# Patient Record
Sex: MALE | Race: WHITE | Employment: FULL TIME | URBAN - METROPOLITAN AREA
[De-identification: names, ages, dates, MRNs, and addresses within clinical notes are randomized per-mention and may not be internally consistent; named-entity substitution may affect disease eponyms.]

---

## 2018-10-15 ENCOUNTER — HOSPITAL ENCOUNTER (OUTPATIENT)
Age: 48
Discharge: HOME | End: 2018-10-15
Payer: COMMERCIAL

## 2019-04-27 ENCOUNTER — NURSE TRIAGE (OUTPATIENT)
Dept: OTHER | Facility: CLINIC | Age: 49
End: 2019-04-27

## 2019-04-27 ENCOUNTER — HOSPITAL ENCOUNTER (INPATIENT)
Age: 49
LOS: 3 days | Discharge: HOME | DRG: 885 | End: 2019-05-01
Attending: EMERGENCY MEDICINE | Admitting: PSYCHIATRY & NEUROLOGY
Payer: COMMERCIAL

## 2019-04-27 DIAGNOSIS — R45.851 SUICIDAL IDEATION: Primary | ICD-10-CM

## 2019-04-27 LAB
ACETAMINOPHEN LEVEL: <5 MCG/ML (ref 10–30)
ALBUMIN SERPL-MCNC: 4.6 G/DL (ref 3.5–5.2)
ALP BLD-CCNC: 108 U/L (ref 40–129)
ALT SERPL-CCNC: 24 U/L (ref 0–40)
AMPHETAMINE SCREEN, URINE: NOT DETECTED
ANION GAP SERPL CALCULATED.3IONS-SCNC: 14 MMOL/L (ref 7–16)
AST SERPL-CCNC: 23 U/L (ref 0–39)
BACTERIA: NORMAL /HPF
BARBITURATE SCREEN URINE: NOT DETECTED
BASOPHILS ABSOLUTE: 0.05 E9/L (ref 0–0.2)
BASOPHILS RELATIVE PERCENT: 0.4 % (ref 0–2)
BENZODIAZEPINE SCREEN, URINE: NOT DETECTED
BILIRUB SERPL-MCNC: 0.3 MG/DL (ref 0–1.2)
BILIRUBIN URINE: NEGATIVE
BLOOD, URINE: NEGATIVE
BUN BLDV-MCNC: 10 MG/DL (ref 6–20)
CALCIUM SERPL-MCNC: 9.8 MG/DL (ref 8.6–10.2)
CANNABINOID SCREEN URINE: NOT DETECTED
CHLORIDE BLD-SCNC: 102 MMOL/L (ref 98–107)
CLARITY: CLEAR
CO2: 28 MMOL/L (ref 22–29)
COCAINE METABOLITE SCREEN URINE: NOT DETECTED
COLOR: ABNORMAL
CREAT SERPL-MCNC: 1 MG/DL (ref 0.7–1.2)
EOSINOPHILS ABSOLUTE: 0.16 E9/L (ref 0.05–0.5)
EOSINOPHILS RELATIVE PERCENT: 1.4 % (ref 0–6)
ETHANOL: 213 MG/DL (ref 0–0.08)
GFR AFRICAN AMERICAN: >60
GFR NON-AFRICAN AMERICAN: >60 ML/MIN/1.73
GLUCOSE BLD-MCNC: 113 MG/DL (ref 74–99)
GLUCOSE URINE: NEGATIVE MG/DL
HCT VFR BLD CALC: 48 % (ref 37–54)
HEMOGLOBIN: 16.3 G/DL (ref 12.5–16.5)
IMMATURE GRANULOCYTES #: 0.08 E9/L
IMMATURE GRANULOCYTES %: 0.7 % (ref 0–5)
KETONES, URINE: NEGATIVE MG/DL
LEUKOCYTE ESTERASE, URINE: ABNORMAL
LYMPHOCYTES ABSOLUTE: 3.17 E9/L (ref 1.5–4)
LYMPHOCYTES RELATIVE PERCENT: 27.6 % (ref 20–42)
MCH RBC QN AUTO: 29.1 PG (ref 26–35)
MCHC RBC AUTO-ENTMCNC: 34 % (ref 32–34.5)
MCV RBC AUTO: 85.6 FL (ref 80–99.9)
METHADONE SCREEN, URINE: NOT DETECTED
MONOCYTES ABSOLUTE: 0.76 E9/L (ref 0.1–0.95)
MONOCYTES RELATIVE PERCENT: 6.6 % (ref 2–12)
NEUTROPHILS ABSOLUTE: 7.26 E9/L (ref 1.8–7.3)
NEUTROPHILS RELATIVE PERCENT: 63.3 % (ref 43–80)
NITRITE, URINE: NEGATIVE
OPIATE SCREEN URINE: NOT DETECTED
PDW BLD-RTO: 14.2 FL (ref 11.5–15)
PH UA: 6.5 (ref 5–9)
PHENCYCLIDINE SCREEN URINE: NOT DETECTED
PLATELET # BLD: 298 E9/L (ref 130–450)
PMV BLD AUTO: 8.9 FL (ref 7–12)
POTASSIUM SERPL-SCNC: 3.9 MMOL/L (ref 3.5–5)
PROPOXYPHENE SCREEN: NOT DETECTED
PROTEIN UA: NEGATIVE MG/DL
RBC # BLD: 5.61 E12/L (ref 3.8–5.8)
RBC UA: NORMAL /HPF (ref 0–2)
SALICYLATE, SERUM: <0.3 MG/DL (ref 0–30)
SODIUM BLD-SCNC: 144 MMOL/L (ref 132–146)
SPECIFIC GRAVITY UA: <=1.005 (ref 1–1.03)
TOTAL PROTEIN: 8.2 G/DL (ref 6.4–8.3)
TRICYCLIC ANTIDEPRESSANTS SCREEN SERUM: NEGATIVE NG/ML
TSH SERPL DL<=0.05 MIU/L-ACNC: 3.59 UIU/ML (ref 0.27–4.2)
UROBILINOGEN, URINE: 0.2 E.U./DL
WBC # BLD: 11.5 E9/L (ref 4.5–11.5)
WBC UA: NORMAL /HPF (ref 0–5)

## 2019-04-27 PROCEDURE — 93005 ELECTROCARDIOGRAM TRACING: CPT | Performed by: NURSE PRACTITIONER

## 2019-04-27 PROCEDURE — 80307 DRUG TEST PRSMV CHEM ANLYZR: CPT

## 2019-04-27 PROCEDURE — 81001 URINALYSIS AUTO W/SCOPE: CPT

## 2019-04-27 PROCEDURE — 36415 COLL VENOUS BLD VENIPUNCTURE: CPT

## 2019-04-27 PROCEDURE — 80053 COMPREHEN METABOLIC PANEL: CPT

## 2019-04-27 PROCEDURE — 85025 COMPLETE CBC W/AUTO DIFF WBC: CPT

## 2019-04-27 PROCEDURE — 84443 ASSAY THYROID STIM HORMONE: CPT

## 2019-04-27 PROCEDURE — 99285 EMERGENCY DEPT VISIT HI MDM: CPT

## 2019-04-27 PROCEDURE — G0480 DRUG TEST DEF 1-7 CLASSES: HCPCS

## 2019-04-27 RX ORDER — RANITIDINE 150 MG/1
150 TABLET ORAL 2 TIMES DAILY
COMMUNITY

## 2019-04-27 RX ORDER — LORAZEPAM 2 MG/ML
2 INJECTION INTRAMUSCULAR ONCE
Status: DISCONTINUED | OUTPATIENT
Start: 2019-04-27 | End: 2019-05-01 | Stop reason: HOSPADM

## 2019-04-27 ASSESSMENT — PAIN DESCRIPTION - DESCRIPTORS: DESCRIPTORS: ACHING

## 2019-04-27 ASSESSMENT — PAIN DESCRIPTION - PAIN TYPE: TYPE: CHRONIC PAIN

## 2019-04-27 ASSESSMENT — PAIN DESCRIPTION - LOCATION: LOCATION: GENERALIZED

## 2019-04-27 ASSESSMENT — PAIN SCALES - GENERAL: PAINLEVEL_OUTOF10: 10
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
## 2019-04-27 NOTE — LETTER
[address] [place] [address]  Phone: [phone]        May 1, 2019     Patient: Prudencio Sow   YOB: 1970   Date of Visit: 4/27/2019       To Whom it May Concern:    Dyllan Finley was admitted to the hospital 4/27/19 and medically discharged 5/1/19. If you have any questions or concerns, please don't hesitate to call.     Sincerely,         Raven Carrasco MSW, LSW

## 2019-04-28 PROBLEM — R45.851 DEPRESSION WITH SUICIDAL IDEATION: Status: ACTIVE | Noted: 2019-04-28

## 2019-04-28 PROBLEM — F32.A DEPRESSION WITH SUICIDAL IDEATION: Status: ACTIVE | Noted: 2019-04-28

## 2019-04-28 PROBLEM — F33.2 SEVERE EPISODE OF RECURRENT MAJOR DEPRESSIVE DISORDER, WITHOUT PSYCHOTIC FEATURES (HCC): Status: ACTIVE | Noted: 2019-04-28

## 2019-04-28 LAB — ETHANOL: 113 MG/DL (ref 0–0.08)

## 2019-04-28 PROCEDURE — 6370000000 HC RX 637 (ALT 250 FOR IP): Performed by: PSYCHIATRY & NEUROLOGY

## 2019-04-28 PROCEDURE — 6370000000 HC RX 637 (ALT 250 FOR IP): Performed by: NURSE PRACTITIONER

## 2019-04-28 PROCEDURE — G0480 DRUG TEST DEF 1-7 CLASSES: HCPCS

## 2019-04-28 PROCEDURE — 36415 COLL VENOUS BLD VENIPUNCTURE: CPT

## 2019-04-28 PROCEDURE — 99221 1ST HOSP IP/OBS SF/LOW 40: CPT | Performed by: NURSE PRACTITIONER

## 2019-04-28 PROCEDURE — 1240000000 HC EMOTIONAL WELLNESS R&B

## 2019-04-28 RX ORDER — MONTELUKAST SODIUM 10 MG/1
10 TABLET ORAL NIGHTLY
Status: DISCONTINUED | OUTPATIENT
Start: 2019-04-28 | End: 2019-05-01 | Stop reason: HOSPADM

## 2019-04-28 RX ORDER — FOLIC ACID 1 MG/1
1 TABLET ORAL DAILY
Status: DISCONTINUED | OUTPATIENT
Start: 2019-04-28 | End: 2019-05-01 | Stop reason: HOSPADM

## 2019-04-28 RX ORDER — NALTREXONE HYDROCHLORIDE 50 MG/1
50 TABLET, FILM COATED ORAL DAILY
Status: DISCONTINUED | OUTPATIENT
Start: 2019-04-28 | End: 2019-05-01 | Stop reason: HOSPADM

## 2019-04-28 RX ORDER — NICOTINE 21 MG/24HR
1 PATCH, TRANSDERMAL 24 HOURS TRANSDERMAL DAILY
Status: DISCONTINUED | OUTPATIENT
Start: 2019-04-28 | End: 2019-05-01 | Stop reason: HOSPADM

## 2019-04-28 RX ORDER — FLUTICASONE PROPIONATE 50 MCG
1 SPRAY, SUSPENSION (ML) NASAL DAILY
Status: DISCONTINUED | OUTPATIENT
Start: 2019-04-28 | End: 2019-05-01 | Stop reason: HOSPADM

## 2019-04-28 RX ORDER — LORAZEPAM 1 MG/1
3 TABLET ORAL
Status: DISCONTINUED | OUTPATIENT
Start: 2019-04-28 | End: 2019-05-01

## 2019-04-28 RX ORDER — LORAZEPAM 2 MG/ML
1 INJECTION INTRAMUSCULAR
Status: DISCONTINUED | OUTPATIENT
Start: 2019-04-28 | End: 2019-05-01

## 2019-04-28 RX ORDER — LISINOPRIL 10 MG/1
10 TABLET ORAL DAILY
Status: DISCONTINUED | OUTPATIENT
Start: 2019-04-28 | End: 2019-05-01 | Stop reason: HOSPADM

## 2019-04-28 RX ORDER — THIAMINE MONONITRATE (VIT B1) 100 MG
100 TABLET ORAL DAILY
Status: DISCONTINUED | OUTPATIENT
Start: 2019-04-28 | End: 2019-05-01 | Stop reason: HOSPADM

## 2019-04-28 RX ORDER — LORAZEPAM 1 MG/1
2 TABLET ORAL
Status: DISCONTINUED | OUTPATIENT
Start: 2019-04-28 | End: 2019-05-01

## 2019-04-28 RX ORDER — MULTIVITAMIN WITH FOLIC ACID 400 MCG
1 TABLET ORAL DAILY
Status: DISCONTINUED | OUTPATIENT
Start: 2019-04-28 | End: 2019-05-01 | Stop reason: HOSPADM

## 2019-04-28 RX ORDER — LORAZEPAM 2 MG/ML
4 INJECTION INTRAMUSCULAR
Status: DISCONTINUED | OUTPATIENT
Start: 2019-04-28 | End: 2019-05-01

## 2019-04-28 RX ORDER — SODIUM CHLORIDE 0.9 % (FLUSH) 0.9 %
10 SYRINGE (ML) INJECTION EVERY 12 HOURS SCHEDULED
Status: DISCONTINUED | OUTPATIENT
Start: 2019-04-28 | End: 2019-05-01

## 2019-04-28 RX ORDER — ONDANSETRON 2 MG/ML
4 INJECTION INTRAMUSCULAR; INTRAVENOUS EVERY 6 HOURS PRN
Status: DISCONTINUED | OUTPATIENT
Start: 2019-04-28 | End: 2019-05-01

## 2019-04-28 RX ORDER — LORAZEPAM 2 MG/ML
2 INJECTION INTRAMUSCULAR
Status: DISCONTINUED | OUTPATIENT
Start: 2019-04-28 | End: 2019-05-01

## 2019-04-28 RX ORDER — LORAZEPAM 1 MG/1
1 TABLET ORAL
Status: DISCONTINUED | OUTPATIENT
Start: 2019-04-28 | End: 2019-05-01

## 2019-04-28 RX ORDER — LORAZEPAM 1 MG/1
4 TABLET ORAL
Status: DISCONTINUED | OUTPATIENT
Start: 2019-04-28 | End: 2019-05-01

## 2019-04-28 RX ORDER — FAMOTIDINE 20 MG/1
20 TABLET, FILM COATED ORAL 2 TIMES DAILY
Status: DISCONTINUED | OUTPATIENT
Start: 2019-04-28 | End: 2019-05-01 | Stop reason: HOSPADM

## 2019-04-28 RX ORDER — DIVALPROEX SODIUM 250 MG/1
500 TABLET, DELAYED RELEASE ORAL 2 TIMES DAILY
Status: DISCONTINUED | OUTPATIENT
Start: 2019-04-28 | End: 2019-05-01 | Stop reason: HOSPADM

## 2019-04-28 RX ORDER — LORAZEPAM 2 MG/ML
3 INJECTION INTRAMUSCULAR
Status: DISCONTINUED | OUTPATIENT
Start: 2019-04-28 | End: 2019-05-01

## 2019-04-28 RX ORDER — SODIUM CHLORIDE 0.9 % (FLUSH) 0.9 %
10 SYRINGE (ML) INJECTION PRN
Status: DISCONTINUED | OUTPATIENT
Start: 2019-04-28 | End: 2019-05-01

## 2019-04-28 RX ORDER — THIAMINE HYDROCHLORIDE 100 MG/ML
100 INJECTION, SOLUTION INTRAMUSCULAR; INTRAVENOUS DAILY
Status: DISCONTINUED | OUTPATIENT
Start: 2019-04-28 | End: 2019-04-28 | Stop reason: SDUPTHER

## 2019-04-28 RX ORDER — CETIRIZINE HYDROCHLORIDE 10 MG/1
10 TABLET ORAL DAILY
Status: DISCONTINUED | OUTPATIENT
Start: 2019-04-28 | End: 2019-05-01 | Stop reason: HOSPADM

## 2019-04-28 RX ADMIN — LISINOPRIL 10 MG: 10 TABLET ORAL at 11:30

## 2019-04-28 RX ADMIN — NALTREXONE HYDROCHLORIDE 50 MG: 50 TABLET, FILM COATED ORAL at 11:30

## 2019-04-28 RX ADMIN — FOLIC ACID 1 MG: 1 TABLET ORAL at 08:38

## 2019-04-28 RX ADMIN — NICOTINE POLACRILEX 2 MG: 2 GUM, CHEWING BUCCAL at 13:37

## 2019-04-28 RX ADMIN — NICOTINE POLACRILEX 2 MG: 2 GUM, CHEWING BUCCAL at 09:28

## 2019-04-28 RX ADMIN — DIVALPROEX SODIUM 500 MG: 250 TABLET, DELAYED RELEASE ORAL at 11:29

## 2019-04-28 RX ADMIN — DIVALPROEX SODIUM 500 MG: 250 TABLET, DELAYED RELEASE ORAL at 20:56

## 2019-04-28 RX ADMIN — FLUTICASONE PROPIONATE 1 SPRAY: 50 SPRAY, METERED NASAL at 11:29

## 2019-04-28 RX ADMIN — FAMOTIDINE 20 MG: 20 TABLET ORAL at 20:56

## 2019-04-28 RX ADMIN — FAMOTIDINE 20 MG: 20 TABLET ORAL at 11:29

## 2019-04-28 RX ADMIN — MONTELUKAST SODIUM 10 MG: 10 TABLET, FILM COATED ORAL at 20:56

## 2019-04-28 RX ADMIN — MULTIVITAMIN TABLET 1 TABLET: TABLET at 08:38

## 2019-04-28 RX ADMIN — NICOTINE POLACRILEX 2 MG: 2 GUM, CHEWING BUCCAL at 19:14

## 2019-04-28 RX ADMIN — SERTRALINE 150 MG: 100 TABLET, FILM COATED ORAL at 11:30

## 2019-04-28 RX ADMIN — CETIRIZINE HYDROCHLORIDE 10 MG: 10 TABLET, FILM COATED ORAL at 11:29

## 2019-04-28 RX ADMIN — Medication 100 MG: at 08:38

## 2019-04-28 ASSESSMENT — PAIN SCALES - GENERAL
PAINLEVEL_OUTOF10: 0

## 2019-04-28 ASSESSMENT — LIFESTYLE VARIABLES
HISTORY_ALCOHOL_USE: YES
HISTORY_ALCOHOL_USE: YES

## 2019-04-28 ASSESSMENT — SLEEP AND FATIGUE QUESTIONNAIRES
RESTFUL SLEEP: YES
DO YOU HAVE DIFFICULTY SLEEPING: YES
DIFFICULTY ARISING: NO
DO YOU USE A SLEEP AID: NO
DO YOU HAVE DIFFICULTY SLEEPING: YES
DIFFICULTY STAYING ASLEEP: YES
SLEEP PATTERN: DISTURBED/INTERRUPTED SLEEP
AVERAGE NUMBER OF SLEEP HOURS: 6
AVERAGE NUMBER OF SLEEP HOURS: 6
SLEEP PATTERN: DISTURBED/INTERRUPTED SLEEP
DIFFICULTY STAYING ASLEEP: YES
RESTFUL SLEEP: YES
DO YOU USE A SLEEP AID: NO
DIFFICULTY FALLING ASLEEP: YES
DIFFICULTY ARISING: NO
DIFFICULTY FALLING ASLEEP: YES

## 2019-04-28 ASSESSMENT — PATIENT HEALTH QUESTIONNAIRE - PHQ9
SUM OF ALL RESPONSES TO PHQ QUESTIONS 1-9: 4
SUM OF ALL RESPONSES TO PHQ QUESTIONS 1-9: 8

## 2019-04-28 NOTE — H&P
PSYCHIATRIC EVALUATION  (HISTORY & PHYSICAL)     CHIEF COMPLAINT:   [x] Mood Problems [x] Anxiety Problems [] Psychosis                    [x] Suicidal/Homicidal   [] Aggression  [] Other    HISTORY OF PRESENT ILLNESS: Enzo Rausch  is a 53 y.o. male who has a previous psychiatric history of depression and alcohol use and presents for admission with increased sadness and SI with plan to shoot self while intoxicated. Patient states that he lost his mother the day before Easter 11 years ago due to a drunk  and lost his  this year the day before Easter. Patient states that he felt increasingly sad and \"had a bad week. \"  patient denies HI or AVH. Patient is interested in alcohol treatment outpatient. Symptoms are constant, severe, and worsened by alcohol use.         Precipitating Factors:     [] Family Stress   [x] Recent loss/grief Stress   [] Health Stress   [] Relationship Stress    [] Legal Stress   [x] Environmental Stress    [] Occupational Stress   [] Financial Stress   [x] Substance Abuse [] Other      PAST PSYCHIATRIC HISTORY:   History of psychiatric Hospitalization:    [x] Denies    [] yes  [] Days ago     []  Weeks Ago    [] Months ago  [] Years ago              [] [facility]  [] [facility]  [] Other:        [] Once  [] More than once    Outpatient treatment:  [] [facility]  [] [facility]  [] [facility]              [] [facility]  [] John Gaston      [] [address] [place] [address] [] [facility]      [] [facility] [] [facility]  [] [facility] [] [facility]             [] currently  [x] in the past  [] Non-Compliant    [] Denies    Previous suicide attempt: [x]Denies            [] yes  [] OD  [] Cutting  [] Hanging  [] Gun  [] Other    Previous psych medications:  [] Was prescribed               [x] Currently Taking       [] Never taken medications      PAST MEDICAL HISTORY:       Diagnosis Date    Hypertension        FAMILY PSYCHIATRIC HISTORY:  Family History   Problem Relation Age of Onset    Heart Disease Mother     Diabetes Mother     Thyroid Disease Mother     Heart Disease Father            [x] Denies       [] Endorses               [] Father                [] Depression  [] Anxiety  [] Bipolar  [] Psychosis  []  Other                  [] Mother               [] Depression  [] Anxiety  [] Bipolar  [] Psychosis  []  Other                  [] Sibling               [] Depression  [] Anxiety  [] Bipolar  [] Psychosis  []  Other                  [] Grandparent               [] Depression  [] Anxiety  [] Bipolar  [] Psychosis  []  Other       SOCIAL HISTORY:     1. Living Situation:[x] Private Residence [] Homeless [] [address]             [] Assisted Living [] [address]  [] Shelter [] Other   2. Employment:  [] Unemployed  [x] Employed  [] Disabled  [] Retired   1. Legal History: [x] No Arrest [] Arrest  [] Theft  []  Assault  [] Substances   4. History of Trama/ Abuse: [x] Denies  [] Emotional [] Physical [] Sexual   5. Spirituality: [x] Spiritual [] Not Spiritual   6. Substance Abuse: [] Denies  [x] Drug of choice      [] Amphetamines [] Marijuana [] Cocaine      [] Opioids  [x] Alcohol  [] Benzodiazepines     For further SH review SW note. Risk Assessment:  1. Risk Factors:   [x] Depression  [x] Anxiety  [] Psychosis   [x] Suicidal/Homicidal Thoughts [] Suicide Attempt [x] Substance Abuse     2. Protective Factors: [x] Controlled Environment         [x] Supportive Family [x]         [] [employer] Support     3. Level of Risk: [] Mild [] Moderate [x] Severe      Strengths & Weaknesses:    1. Strengths: [x] Ability to communicate feelings     [x] Independent ADL's     [x] Supportive Family    [] Current Health Status     2.  Weaknesses: [] Emotional          [] Motivational     MEDICATIONS: Current Facility-Administered Medications: sodium chloride flush 0.9 % injection 10 mL, 10 mL, Intravenous, 2 times per day  sodium chloride flush 0.9 % injection 10 mL, 10 mL, Intravenous, PRN  vitamin B-1 (THIAMINE) tablet 100 mg, 100 mg, Oral, Daily  folic acid (FOLVITE) tablet 1 mg, 1 mg, Oral, Daily  multivitamin 1 tablet, 1 tablet, Oral, Daily  ondansetron (ZOFRAN) injection 4 mg, 4 mg, Intravenous, Q6H PRN  nicotine (NICODERM CQ) 21 MG/24HR 1 patch, 1 patch, Transdermal, Daily  nicotine polacrilex (NICORETTE) gum 2 mg, 2 mg, Oral, Q2H PRN  LORazepam (ATIVAN) tablet 1 mg, 1 mg, Oral, Q1H PRN **OR** LORazepam (ATIVAN) injection 1 mg, 1 mg, Intravenous, Q1H PRN **OR** LORazepam (ATIVAN) tablet 2 mg, 2 mg, Oral, Q1H PRN **OR** LORazepam (ATIVAN) injection 2 mg, 2 mg, Intravenous, Q1H PRN **OR** LORazepam (ATIVAN) tablet 3 mg, 3 mg, Oral, Q1H PRN **OR** LORazepam (ATIVAN) injection 3 mg, 3 mg, Intravenous, Q1H PRN **OR** LORazepam (ATIVAN) tablet 4 mg, 4 mg, Oral, Q1H PRN **OR** LORazepam (ATIVAN) injection 4 mg, 4 mg, Intravenous, Q1H PRN  cetirizine (ZYRTEC) tablet 10 mg, 10 mg, Oral, Daily  fluticasone (FLONASE) 50 MCG/ACT nasal spray 1 spray, 1 spray, Nasal, Daily  lisinopril (PRINIVIL;ZESTRIL) tablet 10 mg, 10 mg, Oral, Daily  montelukast (SINGULAIR) tablet 10 mg, 10 mg, Oral, Nightly  famotidine (PEPCID) tablet 20 mg, 20 mg, Oral, BID  sertraline (ZOLOFT) tablet 150 mg, 150 mg, Oral, Daily  LORazepam (ATIVAN) injection 2 mg, 2 mg, Intramuscular, Once    Medical Review of Systems:     All other than marked systmes have been reviewed and are all negative.     Constitutional Symptoms: []  fever []  Chills  Skin Symptoms: [] rash []  Pruritus   Eye Symptoms: [] Vision unchanged []  recent vision problems[] blurred vision   Respiratory Symptoms:[] shortness of breath [] cough  Cardiovascular Symptoms:  [] chest pain   [] palpitations   Gastrointestinal Symptoms: []  abdominal pain []  nausea []  vomiting []  diarrhea  Genitourinary Symptoms: []  dysuria  []  hematuria   Musculoskeletal Symptoms: []  back pain []  muscle pain [x]  joint pain  Neurologic Symptoms: []  headache []  dizziness  Hematolymphoid Symptoms: [] Adenopathy [] Bruises   [] Schimosis       VITALS: BP (!) 158/104   Pulse 85   Temp 97.8 °F (36.6 °C) (Oral)   Resp 16   Ht 5' 7\" (1.702 m)   Wt 250 lb (113.4 kg)   SpO2 97%   BMI 39.16 kg/m²     ALLERGIES: Amoxicillin            Physical Examination:    Head:  [x] Atraumatic:  [x] normocephalic  Skin and Mucosa       [] Moist [] Dry [] Pale [x] Normal   Neck: [x] Thyroid [] Palpable    [x] Not palpable []  venus distention [] adenopathy   Chest: [x] Clear [] Rhonchi  [] Wheezing   CV: [x] S1 [x] S2 [x] No murmer   Abdomen:  [x] Soft   [] Tender  [] Viceromegaly   Extremities:  [x] No Edema   [] Edema    Cranial Nerves Examination:    CN II: [x] Pupils are reactive to light [] Pupils are non reactive to light  CN III, IV, VI:[x] No eye deviation  [x] No diplopia or ptosis   CN V: [x] Facial Sensation is intact  [] Facial Sensation is not intact   CN IIIV:  [x] Hearing is normal to rubbing fingers   CN IX, X:  [x] Normal gag reflex and phonation   CN XI: [x] Shoulder shrug and neck rotation is normal  CNXII: [x] Tongue is midline no deviation or atrophy       For further PE refer to ED note    MENTAL STATUS EXAM:       Mental Status Examination:    Cognition:      [x] Alert  [x] Awake  [x] Oriented  [x] Person  [x] Place [x] Time      [] drowsy  [] tired  [] lethargic  [] distractable  []     Attention/Concentration:   [x] Attentive  [] Distracted        Memory Recent and Remote: [x] Intact   [] Impaired [] Partially Impaired     Language: [] Able to recognize and name objects          [] Unable to recognize and name Objects    Fund of Knowledge:  [] Poor []  Fair  [x] Good    Speech: [x] Normal  [] Soft  [] Slow  [] Fast [] Pressured            [] Loud [] Dysarthria  [] Incoherent       Appearance: [] Well Groomed  [x] Casual Dressed  [] Unkept  [] Disheveled          [] Normal weight[] Thin  [] Overweight  [] Obese           Attitude: [] Positive  [] Hostile  [] Demanding  [] Guarded  [] Defensive

## 2019-04-28 NOTE — ED NOTES
Please contact Dr Trina Levin in the am for CPAP setting so unit can be ordered for bedtime. PEER Support consult has been put in. Report called to [facility], RN.  Pt going to [address]  04/28/19 [id number]

## 2019-04-28 NOTE — ED NOTES
ANA signed for wife, Sanjuanita Garsia. Wife and kids have left to go home for the night. They are aware of him being admitted after being medically cleared.  Phone to contact: [phone]     Wiliam Guzman RN  04/27/19 [address], RN  04/27/19 3704

## 2019-04-28 NOTE — PROGRESS NOTES
`[facility]  Admission Note     Admission Type:   Admission Type: Involuntary    Reason for admission:  Reason for Admission: suicidal with plan to eat bullet    PATIENT STRENGTHS:  Strengths: Communication    Patient Strengths and Limitations:  Limitations: Tendency to isolate self    Addictive Behavior:   Addictive Behavior  In the past 3 months, have you felt or has someone told you that you have a problem with:  : None  Do you have a history of Chemical Use?: No  Do you have a history of Alcohol Use?: Yes  Do you have a history of Street Drug Abuse?: No  Histroy of Prescripton Drug Abuse?: No    Medical Problems:   Past Medical History:   Diagnosis Date    Hypertension        Status EXAM:  Status and Exam  Normal: No  Facial Expression: Worried, Sad  Affect: Appropriate  Level of Consciousness: Alert  Mood:Normal: No  Mood: Anxious, Depressed, Sad  Motor Activity:Normal: Yes  Motor Activity: Decreased  Interview Behavior: Cooperative, Irritable  Preception: [place] to Person, Janann Ruse to Time, [place] to Place, [place] to Situation  Attention:Normal: Yes  Attention: (organized)  Thought Content:Normal: Yes  Hallucinations: None  Delusions: No  Memory:Normal: Yes  Insight and Judgment: No  Insight and Judgment: Poor Judgment, Poor Insight, Unmotivated, Unrealistic  Present Suicidal Ideation: No  Present Homicidal Ideation: No    Tobacco Screening:  Practical Counseling, on admission, shahida X, if applicable and completed (first 3 are required if patient doesn't refuse):             ( x)  Recognizing danger situations (included triggers and roadblocks)                    (x )  Coping skills (new ways to manage stress, exercise, relaxation techniques, changing routine, distraction)                                                           ( x)  Basic information about quitting (benefits of quitting, techniques in how to quit, available resources  ( ) [facility]                                           ( ) Patient refused counseling  ( ) Patient has not smoked in the last 30 days    Metabolic Screening:    No results found for: LABA1C    Lab Results   Component Value Date    CHOL 243 (H) 12/03/2016    CHOL 202 (H) 10/30/2015    CHOL 228 (H) 10/31/2014    CHOL 210 (H) 03/20/2012    CHOL 236 (H) 11/08/2010     Lab Results   Component Value Date    TRIG 172 (H) 12/03/2016    TRIG 102 10/30/2015    TRIG 126 10/31/2014    TRIG 159 (H) 03/20/2012    TRIG 162 (H) 11/08/2010     Lab Results   Component Value Date    HDL 56 12/03/2016    HDL 45 10/30/2015    HDL 48 10/31/2014    HDL 44.0 03/20/2012    HDL 48.0 11/08/2010     No components found for: LDLCAL  Lab Results   Component Value Date    LABVLDL 34 12/03/2016    LABVLDL 20 10/30/2015    LABVLDL 25 10/31/2014         Body mass index is 39.16 kg/m². BP Readings from Last 2 Encounters:   04/28/19 (!) 142/88   01/27/17 (!) 148/89           Pt admitted with followings belongings:  Dentures: None  Vision - Corrective Lenses: Glasses  Hearing Aid: None  Jewelry: Ring  Body Piercings Removed: N/A  Clothing: Pants, Shirt, Undergarments (Comment), Socks  Were All Patient Medications Collected?: Not Applicable  Other Valuables: Cell phone     . Patient oriented to surroundings and program expectations and copy of patient rights given. Received admission packet:  yes. Consents reviewed, signed yes. Refused no. Patient verbalize understanding:  yes.     Patient education on precautions: yes                   Suyapa Thibodeaux RN

## 2019-04-28 NOTE — ED NOTES
Pt told this  to Gretel Da Silva off\". Pt is agitated at multiple ER personal and ED nurse Herlinda Poe was able to complete an assessment. Pt ETOH is 213 at this time. Unable to complete CSSRS and SBIRT.      Leola Mcgill, [facility]  04/27/19 [id number]

## 2019-04-28 NOTE — ED PROVIDER NOTES
History: family history includes Diabetes in his mother; Heart Disease in his father and mother; Thyroid Disease in his mother. Allergies: Amoxicillin    Physical Exam           ED Triage Vitals   BP Temp Temp src Pulse Resp SpO2 Height Weight   04/27/19 2007 04/27/19 1945 -- 04/27/19 1945 04/27/19 2007 04/27/19 1945 04/27/19 2007 04/27/19 2007   (!) 140/96 97.8 °F (36.6 °C)  87 18 96 % 5' 7\" (1.702 m) 250 lb (113.4 kg)      Oxygen Saturation Interpretation: Normal.    General Appearance:  well-appearing, street clothes, fair grooming and fair hygiene. Constitutional:   Level of Consciousness: Awake and alert. ETOH: No.          Distress: none. Cooperativeness: cooperative. Eyes:  PERRL, EOMI, no discharge or conjunctival injection. Ears:  External ears without lesions. Throat:  Pharynx without injection, exudate, or tonsillar hypertrophy. Airway patient. Neck:  Normal ROM. Supple. Respiratory:  Clear to auscultation and breath sounds equal.  CV:  Regular rate and rhythm, normal heart sounds, without pathological murmurs, ectopy, gallops, or rubs. GI:  Abdomen Soft, nontender, good bowel sounds. No firm or pulsatile mass. Back:  No costovertebral tenderness. Integument:  Normal turgor. Warm, dry, without visible rash, unless noted elsewhere. Lymphatics: No lymphangitis or adenopathy noted. Neurological:  Oriented. Motor functions intact. Psychiatric:        Thought Process:       Coherent:  Yes. Delusions / Paranoia: no evidence of delusions and no evidence of paranoia. Flight of ideas:  No.         Rambling conversation:  No.       Affect: labile. Suicidal ideation:  suicidal ideation with clear plan and intent. Homicidal ideation:  no homicidal ideation. Perceptions:  denies any perceptual disturbance present. Insight: below average. Judgement: below average.     Lab / Imaging Results   (All laboratory and radiology results have been personally reviewed by myself)  Labs:  Results for orders placed or performed during the hospital encounter of 04/27/19   Comprehensive Metabolic Panel   Result Value Ref Range    Sodium 144 132 - 146 mmol/L    Potassium 3.9 3.5 - 5.0 mmol/L    Chloride 102 98 - 107 mmol/L    CO2 28 22 - 29 mmol/L    Anion Gap 14 7 - 16 mmol/L    Glucose 113 (H) 74 - 99 mg/dL    BUN 10 6 - 20 mg/dL    CREATININE 1.0 0.7 - 1.2 mg/dL    GFR Non-African American >60 >=60 mL/min/1.73    GFR African American >60     Calcium 9.8 8.6 - 10.2 mg/dL    Total Protein 8.2 6.4 - 8.3 g/dL    Alb 4.6 3.5 - 5.2 g/dL    Total Bilirubin 0.3 0.0 - 1.2 mg/dL    Alkaline Phosphatase 108 40 - 129 U/L    ALT 24 0 - 40 U/L    AST 23 0 - 39 U/L   CBC Auto Differential   Result Value Ref Range    WBC 11.5 4.5 - 11.5 E9/L    RBC 5.61 3.80 - 5.80 E12/L    Hemoglobin 16.3 12.5 - 16.5 g/dL    Hematocrit 48.0 37.0 - 54.0 %    MCV 85.6 80.0 - 99.9 fL    MCH 29.1 26.0 - 35.0 pg    MCHC 34.0 32.0 - 34.5 %    RDW 14.2 11.5 - 15.0 fL    Platelets 342 681 - 691 E9/L    MPV 8.9 7.0 - 12.0 fL    Neutrophils % 63.3 43.0 - 80.0 %    Immature Granulocytes % 0.7 0.0 - 5.0 %    Lymphocytes % 27.6 20.0 - 42.0 %    Monocytes % 6.6 2.0 - 12.0 %    Eosinophils % 1.4 0.0 - 6.0 %    Basophils % 0.4 0.0 - 2.0 %    Neutrophils # 7.26 1.80 - 7.30 E9/L    Immature Granulocytes # 0.08 E9/L    Lymphocytes # 3.17 1.50 - 4.00 E9/L    Monocytes # 0.76 0.10 - 0.95 E9/L    Eosinophils # 0.16 0.05 - 0.50 E9/L    Basophils # 0.05 0.00 - 0.20 E9/L   Serum Drug Screen   Result Value Ref Range    Ethanol Lvl 213 mg/dL    Acetaminophen Level <5.0 (L) 10.0 - 93.0 mcg/mL    Salicylate, Serum <9.0 0.0 - 30.0 mg/dL    TCA Scrn NEGATIVE Cutoff:300 ng/mL   Urinalysis   Result Value Ref Range    Color, UA Straw Straw/Yellow    Clarity, UA Clear Clear    Glucose, Ur Negative Negative mg/dL    Bilirubin Urine Negative Negative    Ketones, Urine Negative Negative mg/dL    Specific Gravity, UA <=1.005 1.005 - 1.030    Blood, Urine Negative Negative    pH, UA 6.5 5.0 - 9.0    Protein, UA Negative Negative mg/dL    Urobilinogen, Urine 0.2 <2.0 E.U./dL    Nitrite, Urine Negative Negative    Leukocyte Esterase, Urine TRACE (A) Negative   Urine Drug Screen   Result Value Ref Range    Amphetamine Screen, Urine NOT DETECTED Negative <1000 ng/mL    Barbiturate Screen, Ur NOT DETECTED Negative < 200 ng/mL    Benzodiazepine Screen, Urine NOT DETECTED Negative < 200 ng/mL    Cannabinoid Scrn, Ur NOT DETECTED Negative < 50ng/mL    Cocaine Metabolite Screen, Urine NOT DETECTED Negative < 300 ng/mL    Opiate Scrn, Ur NOT DETECTED Negative < 300ng/mL    PCP Screen, Urine NOT DETECTED Negative < 25 ng/mL    Methadone Screen, Urine NOT DETECTED Negative <300 ng/mL    Propoxyphene Scrn, Ur NOT DETECTED Negative <300 ng/mL   TSH without Reflex   Result Value Ref Range    TSH 3.590 0.270 - 4.200 uIU/mL   Microscopic Urinalysis   Result Value Ref Range    WBC, UA NONE 0 - 5 /HPF    RBC, UA NONE 0 - 2 /HPF    Bacteria, UA NONE /HPF   EKG 12 Lead   Result Value Ref Range    Ventricular Rate 77 BPM    Atrial Rate 77 BPM    P-R Interval 196 ms    QRS Duration 92 ms    Q-T Interval 398 ms    QTc Calculation (Bazett) 450 ms    P Axis 58 degrees    R Axis 2 degrees    T Axis 68 degrees     Imaging: All Radiology results interpreted by Radiologist unless otherwise noted. No orders to display     EKG #1:  Interpreted by emergency department physician unless otherwise noted. Time:  2116    Rate: 77  Rhythm: Sinus. Interpretation: normal EKG, normal sinus rhythm. ED Course / Medical Decision Making     Medications   LORazepam (ATIVAN) injection 2 mg (has no administration in time range)        Re-examination:  4/27/19       Time: 2130    No change in physical exam.    Consult(s):   IP CONSULT TO SOCIAL WORK   [facility]. Procedure(s):   none    MDM:   Patient evaluated by Dr. Vineet Lugo as well.  Labs and diagnostics as resulted above. EKG as above. Patient was evaluated by the , pink slipped by Dr. Gama Nicole and mental health admission arrangements made as patient is medically cleared. Counseling: The emergency provider has spoken with the patient and discussed todays results, in addition to providing specific details for the plan of care and counseling regarding the diagnosis and prognosis. Questions are answered at this time and they are agreeable with the plan. Assessment      1. Suicidal ideation      Plan   Admit to mental health unit - medically cleared for admission  Patient condition is stable    New Medications     New Prescriptions    No medications on file     Electronically signed by TETO Turner CNP   DD: 4/27/19  **This report was transcribed using voice recognition software. Every effort was made to ensure accuracy; however, inadvertent computerized transcription errors may be present.   END OF ED PROVIDER NOTE       TETO Turner CNP  04/27/19 [id number]

## 2019-04-28 NOTE — PROGRESS NOTES
[facility]  Initial Interdisciplinary Treatment Plan NOTE    Review Date & Time: 4/28/2019 0900     Patient was in treatment team    Admission Type:        Reason for admission:         Estimated Length of Stay Update:  3-5 days  Estimated Discharge Date Update: 5-1-2019    PATIENT STRENGTHS:  Patient Strengths    Patient Strengths and Limitations:   Addictive Behavior:   Medical Problems:  Past Medical History:   Diagnosis Date    Hypertension        EDUCATION:   Learner Progress Toward Treatment Goals: Reviewed group plan and strategies    Method: Small group    Outcome: Needs reinforcement    PATIENT GOALS: \"to get home and get discharged\"    PLAN/TREATMENT RECOMMENDATIONS [id number]    GOALS UPDATE:   Time frame for Short-Term Goals: 3-5 days    Max Guthrie RN

## 2019-04-28 NOTE — PLAN OF CARE
Problem: Altered Mood, Depressive Behavior:  Goal: Able to verbalize acceptance of life and situations over which he or she has no control  Description  Able to verbalize acceptance of life and situations over which he or she has no control  Outcome: Ongoing  Goal: Able to verbalize and/or display a decrease in depressive symptoms  Description  Able to verbalize and/or display a decrease in depressive symptoms  Outcome: Ongoing  Goal: Able to verbalize support systems  Description  Able to verbalize support systems  Outcome: Ongoing  Goal: Participates in care planning  Description  Participates in care planning  Outcome: Ongoing     Problem: Depressive Behavior With or Without Suicide Precautions:  Goal: Able to verbalize acceptance of life and situations over which he or she has no control  Description  Able to verbalize acceptance of life and situations over which he or she has no control  Outcome: Ongoing  Goal: Able to verbalize and/or display a decrease in depressive symptoms  Description  Able to verbalize and/or display a decrease in depressive symptoms  Outcome: Ongoing

## 2019-04-28 NOTE — CARE COORDINATION
SW met with pt to complete biopsychosocial assessment and CSSR. Pt was cooperative and friendly throughout the assessment process. Pt reported he drank too much and said he was suicidal but has never been suicidal. Pt was brought to ED after pt wife and son brought him to ED. Pt reported 11 years ago he lost his mother and last week before Easter he lost his Karate  who he was close with and brought all the grief back for him. Pt admitted to drinking whiskey daily but does not want inpatient treatment. Pt reported he has a supportive wife Dahlia Waters who was present for visitation and reported no safety concerns with pt returning home. Pt wife opened up and discussed pt has been concealing alcohol use. Pt did agree he has been doing this.  Pt is open to outpatient counseling    Pt currently denies HI, denies SI, and denies hallucinations    Pt lives in [place] with his wife and four children    SW will discuss follow up options with pt

## 2019-04-28 NOTE — PROGRESS NOTES
Admission Note:  Pt transferred from [facility] OF [facility] per W/C escorted by Transport for involuntary pink slip admission to [place] [address]. Pt arrived with his eyes closed. Refused to fill out forms/permits. Stated he is \"tired\" and went straight to his room and went to bed. Polite, but firm in his decisions, his underlying irritability. Refused to complete computer admission data base. Permits not signed due to downtime loss of printer connection. Pt placed on suicide precautions staggered q 15 min observations. Pt request to never be touched when he is sleeping cause he will \"wake up swinging\". Requests to be awakened by saying his name. On assault precautions. Pt on Withdrawal precautions and protocol. Placed on suicide precautions staggered q 15 min checks for close observation.

## 2019-04-29 LAB
CHOLESTEROL, TOTAL: 234 MG/DL (ref 0–199)
HBA1C MFR BLD: 6.2 % (ref 4–5.6)
HDLC SERPL-MCNC: 51 MG/DL
LDL CHOLESTEROL CALCULATED: 139 MG/DL (ref 0–99)
TRIGL SERPL-MCNC: 220 MG/DL (ref 0–149)
VLDLC SERPL CALC-MCNC: 44 MG/DL

## 2019-04-29 PROCEDURE — 36415 COLL VENOUS BLD VENIPUNCTURE: CPT

## 2019-04-29 PROCEDURE — 6370000000 HC RX 637 (ALT 250 FOR IP): Performed by: PSYCHIATRY & NEUROLOGY

## 2019-04-29 PROCEDURE — 80061 LIPID PANEL: CPT

## 2019-04-29 PROCEDURE — 6370000000 HC RX 637 (ALT 250 FOR IP): Performed by: NURSE PRACTITIONER

## 2019-04-29 PROCEDURE — 83036 HEMOGLOBIN GLYCOSYLATED A1C: CPT

## 2019-04-29 PROCEDURE — 1240000000 HC EMOTIONAL WELLNESS R&B

## 2019-04-29 PROCEDURE — 99232 SBSQ HOSP IP/OBS MODERATE 35: CPT | Performed by: NURSE PRACTITIONER

## 2019-04-29 RX ORDER — ARIPIPRAZOLE 2 MG/1
2 TABLET ORAL EVERY EVENING
Status: DISCONTINUED | OUTPATIENT
Start: 2019-04-29 | End: 2019-04-30

## 2019-04-29 RX ADMIN — NALTREXONE HYDROCHLORIDE 50 MG: 50 TABLET, FILM COATED ORAL at 08:46

## 2019-04-29 RX ADMIN — FOLIC ACID 1 MG: 1 TABLET ORAL at 08:46

## 2019-04-29 RX ADMIN — FAMOTIDINE 20 MG: 20 TABLET ORAL at 21:04

## 2019-04-29 RX ADMIN — Medication 100 MG: at 08:46

## 2019-04-29 RX ADMIN — FLUTICASONE PROPIONATE 1 SPRAY: 50 SPRAY, METERED NASAL at 08:45

## 2019-04-29 RX ADMIN — DIVALPROEX SODIUM 500 MG: 250 TABLET, DELAYED RELEASE ORAL at 08:46

## 2019-04-29 RX ADMIN — DIVALPROEX SODIUM 500 MG: 250 TABLET, DELAYED RELEASE ORAL at 21:04

## 2019-04-29 RX ADMIN — CETIRIZINE HYDROCHLORIDE 10 MG: 10 TABLET, FILM COATED ORAL at 08:46

## 2019-04-29 RX ADMIN — FAMOTIDINE 20 MG: 20 TABLET ORAL at 08:46

## 2019-04-29 RX ADMIN — MONTELUKAST SODIUM 10 MG: 10 TABLET, FILM COATED ORAL at 21:04

## 2019-04-29 RX ADMIN — ARIPIPRAZOLE 2 MG: 2 TABLET ORAL at 17:35

## 2019-04-29 RX ADMIN — SERTRALINE 150 MG: 100 TABLET, FILM COATED ORAL at 08:46

## 2019-04-29 RX ADMIN — LISINOPRIL 10 MG: 10 TABLET ORAL at 08:46

## 2019-04-29 RX ADMIN — MULTIVITAMIN TABLET 1 TABLET: TABLET at 08:46

## 2019-04-29 RX ADMIN — NICOTINE POLACRILEX 2 MG: 2 GUM, CHEWING BUCCAL at 18:07

## 2019-04-29 RX ADMIN — NICOTINE POLACRILEX 2 MG: 2 GUM, CHEWING BUCCAL at 08:51

## 2019-04-29 ASSESSMENT — PAIN SCALES - GENERAL: PAINLEVEL_OUTOF10: 0

## 2019-04-29 NOTE — PLAN OF CARE
Pt is stable and alert. Pr denies suicidal or homicidal ideations. Pt denies hallucinations. Pt reports goal to get home. Will follow and monitor.

## 2019-04-29 NOTE — PROGRESS NOTES
Thought Content:  [x] Denies [] Endorses [] Suicidal [] Homicidal  [] Delusional      [] Paranoid  [] Somatic  [] Grandiose    Perception: [x]  None  [] Auditory   [] Visual  [] tactile   [] olfactory  [] Illusions         Insight: [] Intact  [] Fair  [x] Limited    Judgement:  [] Intact  [] Fair  [x] Limited      Assessment/Plan:        Patient Active Problem List   Diagnosis Code    Chest pain R07.9    HTN (hypertension), benign I10    Hyperlipidemia E78.5    GERD (gastroesophageal reflux disease) K21.9    Depression with suicidal ideation F32.9, R45.851    Severe episode of recurrent major depressive disorder, without psychotic features ([facility] [address] F33.2         Plan:    []  Patient is refusing medications  [x] Improving as expected   [] Not improving as expected   [] Worsening    []  At Baseline     Start Abilify    Reason for more than one antipsychotic:  [x] N/A  [] 3 failed monotherapy(drugs tried):  [] Cross over to a new antipsychotic  [] Taper to monotherapy from polypharmacy  [] Augmentation of Clozapine therapy due to treatment resistance to single therapy      Signed:  Vj Fowler  4/29/2019  9:59 AM

## 2019-04-29 NOTE — GROUP NOTE
Group Therapy Note    Date: April 29    Group Start Time: 1110  Group End Time: 9603  Group Topic: Psychotherapy    [facility] AC[facility], MSW, LSW        Number of participants:7  Type of group: Psychotherapy  Mode of intervention: Support, Socialization and Exploration  Topic: To improve social interactions to improve human relationships through group psychotherapy  Objective:   To express self openly           Status After Intervention:  Unchanged    Participation Level: Minimal    Participation Quality: Resistant      Speech:  normal      Thought Process/Content: Logical      Affective Functioning: Flat      Mood: angry      Level of consciousness:  Oriented x4      Response to Learning: Resistant      Endings: None Reported    Modes of Intervention: Support, Socialization and Exploration      Discipline Responsible: /Counselor      Signature:  John Jones MSW, LSW

## 2019-04-29 NOTE — GROUP NOTE
Group Therapy Note    Date: April 29    Group Start Time: 0120  Group End Time: 0200  Group Topic: Cognitive Skills    [facility] BH 1    LUI Gunderson LSW      Number of participants:5  Type of group: Cognitive Skills  Mode of intervention: Education  Topic: Mindfulness  Objective:  Number of participants:5         Notes:  Pt was an active participant in a mindfulness /meditation class utilizing ACT techniques. Pt was able to verbalize new knowledge at the end of class. Status After Intervention:  Improved    Participation Level:  Active Listener    Participation Quality: Appropriate      Speech:  normal      Thought Process/Content: Logical      Affective Functioning: Congruent      Mood: anxious      Level of consciousness:  Alert, Oriented x4 and Attentive      Response to Learning: Able to verbalize/acknowledge new learning      Endings: None Reported        Discipline Responsible: /Counselor      Signature:  LUI Gunderson LSW

## 2019-04-29 NOTE — CARE COORDINATION
Pt was frustrated related to tx    SW provided support and explained the treatment process     SW discussed additional treatment options for pt

## 2019-04-29 NOTE — PLAN OF CARE
Pt is stable and alert. Pt denies suicidal ideations. Pt denies hallucinations. Pt cooperative presently. Will follow and monitor.

## 2019-04-30 LAB
EKG ATRIAL RATE: 77 BPM
EKG P AXIS: 58 DEGREES
EKG P-R INTERVAL: 196 MS
EKG Q-T INTERVAL: 398 MS
EKG QRS DURATION: 92 MS
EKG QTC CALCULATION (BAZETT): 450 MS
EKG R AXIS: 2 DEGREES
EKG T AXIS: 68 DEGREES
EKG VENTRICULAR RATE: 77 BPM

## 2019-04-30 PROCEDURE — 6370000000 HC RX 637 (ALT 250 FOR IP): Performed by: PSYCHIATRY & NEUROLOGY

## 2019-04-30 PROCEDURE — 1240000000 HC EMOTIONAL WELLNESS R&B

## 2019-04-30 PROCEDURE — 6370000000 HC RX 637 (ALT 250 FOR IP): Performed by: NURSE PRACTITIONER

## 2019-04-30 PROCEDURE — 99232 SBSQ HOSP IP/OBS MODERATE 35: CPT | Performed by: NURSE PRACTITIONER

## 2019-04-30 RX ORDER — ARIPIPRAZOLE 5 MG/1
5 TABLET ORAL EVERY EVENING
Status: DISCONTINUED | OUTPATIENT
Start: 2019-04-30 | End: 2019-05-01 | Stop reason: HOSPADM

## 2019-04-30 RX ADMIN — NALTREXONE HYDROCHLORIDE 50 MG: 50 TABLET, FILM COATED ORAL at 09:18

## 2019-04-30 RX ADMIN — DIVALPROEX SODIUM 500 MG: 250 TABLET, DELAYED RELEASE ORAL at 20:59

## 2019-04-30 RX ADMIN — SERTRALINE 150 MG: 100 TABLET, FILM COATED ORAL at 09:18

## 2019-04-30 RX ADMIN — NICOTINE POLACRILEX 2 MG: 2 GUM, CHEWING BUCCAL at 09:18

## 2019-04-30 RX ADMIN — FOLIC ACID 1 MG: 1 TABLET ORAL at 09:18

## 2019-04-30 RX ADMIN — CETIRIZINE HYDROCHLORIDE 10 MG: 10 TABLET, FILM COATED ORAL at 09:18

## 2019-04-30 RX ADMIN — ARIPIPRAZOLE 5 MG: 5 TABLET ORAL at 16:25

## 2019-04-30 RX ADMIN — FAMOTIDINE 20 MG: 20 TABLET ORAL at 09:18

## 2019-04-30 RX ADMIN — MULTIVITAMIN TABLET 1 TABLET: TABLET at 09:18

## 2019-04-30 RX ADMIN — NICOTINE POLACRILEX 2 MG: 2 GUM, CHEWING BUCCAL at 14:18

## 2019-04-30 RX ADMIN — FAMOTIDINE 20 MG: 20 TABLET ORAL at 20:59

## 2019-04-30 RX ADMIN — LISINOPRIL 10 MG: 10 TABLET ORAL at 09:18

## 2019-04-30 RX ADMIN — FLUTICASONE PROPIONATE 1 SPRAY: 50 SPRAY, METERED NASAL at 09:17

## 2019-04-30 RX ADMIN — Medication 100 MG: at 09:18

## 2019-04-30 RX ADMIN — DIVALPROEX SODIUM 500 MG: 250 TABLET, DELAYED RELEASE ORAL at 09:18

## 2019-04-30 RX ADMIN — MONTELUKAST SODIUM 10 MG: 10 TABLET, FILM COATED ORAL at 20:59

## 2019-04-30 ASSESSMENT — PAIN SCALES - GENERAL: PAINLEVEL_OUTOF10: 0

## 2019-04-30 NOTE — GROUP NOTE
Group Therapy Note    Date: 4/30/2019  Start Time: 1110  End Time:  6753  Number of Participants: 11    Type of Group: Psychotherapy    Wellness Binder Information  Module Name:  n/a  Session Number:  n/a    Patient's Goal:  To increase socialization and improve communication with others. Notes:  Pt was active in group discussion focusing on ways we push others away and barriers to healthy communication. Status After Intervention:  Improved    Participation Level:  Active Listener and Interactive    Participation Quality: Appropriate and attentive      Speech:  normal      Thought Process/Content: Logical      Affective Functioning: Blunted      Mood: depressed      Level of consciousness:  Alert, Oriented x4 and Attentive      Response to Learning: Able to verbalize current knowledge/experience, Able to verbalize/acknowledge new learning and Progressing to goal      Endings: None Reported    Modes of Intervention: Support, Socialization, Exploration and Problem-solving      Discipline Responsible: /Counselor      Signature:  FOZIA Bob

## 2019-04-30 NOTE — CARE COORDINATION
EFRAIN  Note: d/c planning: SW met with pt and discussed d/c planning. He lives with his wife in [place].  He is under his wife's insurance ([employer] [place] through [facility]) who works here as an audiologist.

## 2019-04-30 NOTE — PROGRESS NOTES
Patient attended community meeting/morning stretch.  Patient identified goal for the day as: \"Talk to doctor about discharge\"

## 2019-04-30 NOTE — PROGRESS NOTES
DATE OF SERVICE:     4/30/2019    Kati Gomes seen today for the purpose of continuation of care. Nursing, social work reports, laboratory studies and vital signs are reviewed. Patient chief complaint today is:             [x] Depression      [x] Anxiety        [] Psychosis         [] Suicidal/Homicidal                         [] Delusions           [] Aggression          Subjective: Today patient states that meds are helping with moods. Pleasant and cooperative. Denies SI. Med compliant and going to groups. Sleep:  [] Good [x] Fair  [] Poor  Appetite:  [] Good [x] Fair  [] Poor    Depression:  [] Mild [] Moderate [x] Severe                [x] Constant [] Sporadic     Anxiety: [] Mild [] Moderate [x] Severe    [] Constant [] Sporadic     Delusions: [] Mild [] Moderate [] Severe     [] Constant [] Sporadic     [] Paranoid [] Somatic [] Grandiose     Hallucinations: [] Mild [] Moderate [] Severe     [] Constant [] Sporadic    [] Auditory  [] Visual [] Tactile       Suicidal: [] Constant [] Sporadic  Homicidal: [] Constant [] Sporadic    Unscheduled Medications     [] Patient Receiving Emergency Medications \" Chemical Restraint\"   [] Requesting PRN medications for anxiety    Medical Review of Systems:     All other than marked systmes have been reviewed and are all negative.     Constitutional Symptoms: []  fever []  Chills  Skin Symptoms: [] rash []  Pruritus   Eye Symptoms: [] Vision unchanged []  recent vision problems[] blurred vision   Respiratory Symptoms:[] shortness of breath [] cough  Cardiovascular Symptoms:  [] chest pain   [] palpitations   Gastrointestinal Symptoms: []  abdominal pain []  nausea []  vomiting []  diarrhea  Genitourinary Symptoms: []  dysuria  []  hematuria   Musculoskeletal Symptoms: []  back pain []  muscle pain []  joint pain  Neurologic Symptoms: []  headache []  dizziness  Hematolymphoid Symptoms: [] Adenopathy [] Bruises   [] Schimosis       Psychiatric Review of [x] Denies [] Endorses [] Suicidal [] Homicidal  [] Delusional      [] Paranoid  [] Somatic  [] Grandiose    Perception: [x]  None  [] Auditory   [] Visual  [] tactile   [] olfactory  [] Illusions         Insight: [] Intact  [] Fair  [x] Limited    Judgement:  [] Intact  [] Fair  [x] Limited      Assessment/Plan:        Patient Active Problem List   Diagnosis Code    Chest pain R07.9    HTN (hypertension), benign I10    Hyperlipidemia E78.5    GERD (gastroesophageal reflux disease) K21.9    Depression with suicidal ideation F32.9, R45.851    Severe episode of recurrent major depressive disorder, without psychotic features ([facility] [address] F33.2         Plan:    []  Patient is refusing medications  [x] Improving as expected   [] Not improving as expected   [] Worsening    []  At Baseline     Increase Abilify    Reason for more than one antipsychotic:  [x] N/A  [] 3 failed monotherapy(drugs tried):  [] Cross over to a new antipsychotic  [] Taper to monotherapy from polypharmacy  [] Augmentation of Clozapine therapy due to treatment resistance to single therapy      Signed:  Lane Lopez  4/30/2019  3:53 PM

## 2019-04-30 NOTE — GROUP NOTE
Group Therapy Note    Date: April 30    Group Start Time: 0361  Group End Time: 0300  Group Topic: Cognitive Skills    [facility] BH 1    LUI Troncoso [place]    Number of participants: 13  Type of group: Cognitive Skills  Mode of intervention: Education, Support, Socialization, Exploration, Clarifying and Problem-solving  Topic: Identification of boundaries   Objective: to identify boundaries and ways to improve boundaries     Notes:  Pt was active and verbal during group and was able to identify ways to improve interactions with others    Status After Intervention:  Improved    Participation Level:  Active Listener and Interactive    Participation Quality: Appropriate      Speech:  normal      Thought Process/Content: Logical      Affective Functioning: Congruent      Mood: euthymic      Level of consciousness:  Alert and Attentive      Response to Learning: Able to verbalize current knowledge/experience and Able to retain information      Endings: None Reported        Discipline Responsible: /Counselor      Signature:  LUI Troncoso, [facility]

## 2019-04-30 NOTE — GROUP NOTE
Group Therapy Note    Date: April 30    Group Start Time: 1000  Group End Time: 3280  Group Topic: Psychoeducation    [facility] [address], [address]        Group Therapy Note    Attendees: 12  Type of group: Psychoeducation  Mode of intervention: Education, Support, Socialization and Exploration  Topic: Tips for Healthy Boundaries  Objective:  Patient will identify ways to create and maintain healthy boundaries in recovery. Notes: Pleasant and sharing with peers. Able to relate to examples and reflect personal impact. Status After Intervention:  Improved    Participation Level:  Active Listener and Interactive    Participation Quality: Appropriate, Attentive and Sharing      Speech:  normal      Thought Process/Content: Logical      Affective Functioning: Congruent      Mood: euthymic      Level of consciousness:  Alert and Attentive      Response to Learning: Capable of insight, Able to change behavior and Progressing to goal      Endings: None Reported    Modes of Intervention: Education, Support, Socialization and Exploration      Discipline Responsible: Psychoeducational Specialist      Signature:  Gautam Menendez, [address]

## 2019-05-01 VITALS
HEART RATE: 80 BPM | OXYGEN SATURATION: 93 % | BODY MASS INDEX: 39.24 KG/M2 | TEMPERATURE: 98.5 F | SYSTOLIC BLOOD PRESSURE: 148 MMHG | HEIGHT: 67 IN | WEIGHT: 250 LBS | RESPIRATION RATE: 16 BRPM | DIASTOLIC BLOOD PRESSURE: 93 MMHG

## 2019-05-01 PROCEDURE — 6370000000 HC RX 637 (ALT 250 FOR IP): Performed by: PSYCHIATRY & NEUROLOGY

## 2019-05-01 PROCEDURE — 99238 HOSP IP/OBS DSCHRG MGMT 30/<: CPT | Performed by: NURSE PRACTITIONER

## 2019-05-01 PROCEDURE — 6370000000 HC RX 637 (ALT 250 FOR IP): Performed by: NURSE PRACTITIONER

## 2019-05-01 RX ORDER — DIVALPROEX SODIUM 500 MG/1
500 TABLET, DELAYED RELEASE ORAL 2 TIMES DAILY
Qty: 90 TABLET | Refills: 0 | Status: SHIPPED | OUTPATIENT
Start: 2019-05-01

## 2019-05-01 RX ORDER — ARIPIPRAZOLE 5 MG/1
5 TABLET ORAL EVERY EVENING
Qty: 30 TABLET | Refills: 0 | Status: SHIPPED | OUTPATIENT
Start: 2019-05-01

## 2019-05-01 RX ORDER — NICOTINE 21 MG/24HR
1 PATCH, TRANSDERMAL 24 HOURS TRANSDERMAL DAILY
Qty: 30 PATCH | Refills: 0 | Status: SHIPPED | OUTPATIENT
Start: 2019-05-01

## 2019-05-01 RX ORDER — NALTREXONE HYDROCHLORIDE 50 MG/1
50 TABLET, FILM COATED ORAL DAILY
Qty: 30 TABLET | Refills: 0 | Status: SHIPPED | OUTPATIENT
Start: 2019-05-02

## 2019-05-01 RX ADMIN — Medication 100 MG: at 09:08

## 2019-05-01 RX ADMIN — NICOTINE POLACRILEX 2 MG: 2 GUM, CHEWING BUCCAL at 09:08

## 2019-05-01 RX ADMIN — FOLIC ACID 1 MG: 1 TABLET ORAL at 09:08

## 2019-05-01 RX ADMIN — FAMOTIDINE 20 MG: 20 TABLET ORAL at 09:07

## 2019-05-01 RX ADMIN — CETIRIZINE HYDROCHLORIDE 10 MG: 10 TABLET, FILM COATED ORAL at 09:08

## 2019-05-01 RX ADMIN — MULTIVITAMIN TABLET 1 TABLET: TABLET at 09:08

## 2019-05-01 RX ADMIN — DIVALPROEX SODIUM 500 MG: 250 TABLET, DELAYED RELEASE ORAL at 09:07

## 2019-05-01 RX ADMIN — NALTREXONE HYDROCHLORIDE 50 MG: 50 TABLET, FILM COATED ORAL at 09:08

## 2019-05-01 RX ADMIN — SERTRALINE 150 MG: 100 TABLET, FILM COATED ORAL at 09:07

## 2019-05-01 RX ADMIN — LISINOPRIL 10 MG: 10 TABLET ORAL at 09:07

## 2019-05-01 RX ADMIN — FLUTICASONE PROPIONATE 1 SPRAY: 50 SPRAY, METERED NASAL at 09:10

## 2019-05-01 RX ADMIN — NICOTINE POLACRILEX 2 MG: 2 GUM, CHEWING BUCCAL at 06:32

## 2019-05-01 NOTE — CARE COORDINATION
In order to ensure appropriate transition and discharge planning is in place, the following documents have been transmitted to [address] [place] [address], as the new outpatient provider:     The d/c diagnosis was transmitted to the next care provider   The reason for hospitalization was transmitted to the next care provider   The d/c medications (dosage and indication) were transmitted to the next care provider    The continuing care plan was transmitted to the next care provider

## 2019-05-01 NOTE — BH NOTE
[address] [place] [address]  Day 3 Interdisciplinary Treatment Plan NOTE    Review Date & Time: 5-1-19  0915 am    Patient was in treatment team    Admission Type:   Admission Type: Involuntary    Reason for admission:  Reason for Admission: suicidal with plan to eat bullet  Estimated Length of Stay Update:  Plan for discharge today. Estimated Discharge Date Update: Plan for discharge today. PATIENT STRENGTHS:  Patient Strengths Strengths: Communication  Patient Strengths and Limitations:Limitations: Lacks leisure interests, Multiple barriers to leisure interests, Difficulty problem solving/relies on others to help solve problems  Addictive Behavior:Addictive Behavior  In the past 3 months, have you felt or has someone told you that you have a problem with:  : None  Do you have a history of Chemical Use?: No  Do you have a history of Alcohol Use?: Yes  Do you have a history of Street Drug Abuse?: No  Histroy of Prescripton Drug Abuse?: No  Medical Problems:  Past Medical History:   Diagnosis Date    Hypertension        Risk:  Fall RiskTotal: 63  Ignacio Scale Ignacio Scale Score: 22  BVC Total: 0  Change in scores 0.  Changes to plan of Care: 0.    Status EXAM:   Status and Exam  Normal: No  Facial Expression: Worried  Affect: Congruent  Level of Consciousness: Alert  Mood:Normal: No  Mood: Anxious  Motor Activity:Normal: Yes  Motor Activity: Increased  Interview Behavior: Cooperative  Preception: [place] to Person, Michelene Dadds to Time, [place] to Place, [place] to Situation  Attention:Normal: Yes  Attention: (organized)  Thought Processes: Circumstantial  Thought Content:Normal: Yes  Hallucinations: None  Delusions: No  Memory:Normal: Yes  Insight and Judgment: No  Insight and Judgment: Poor Insight  Present Suicidal Ideation: No  Present Homicidal Ideation: No    Daily Assessment Last Entry:   Daily Sleep (WDL): Within Defined Limits         Patient Currently in Pain: No  Daily Nutrition (WDL): Within Defined

## 2019-05-01 NOTE — BH NOTE
[facility]  Discharge Note    Pt discharged with followings belongings:   Dentures: None  Vision - Corrective Lenses: Glasses  Hearing Aid: None  Jewelry: Ring  Body Piercings Removed: N/A  Clothing: Pants, Shirt, Undergarments (Comment), Socks  Were All Patient Medications Collected?: Not Applicable  Other Valuables: Cell phone     Patient left department with Departure Mode: With spouse via Mobility at Departure: Ambulatory, discharged to Discharged to: Private Residence. Patient education on aftercare instructions: yes. Patient verbalize understanding of AVS:  yes.     Status EXAM upon discharge:  Status and Exam  Normal: No  Facial Expression: Worried  Affect: Congruent  Level of Consciousness: Alert  Mood:Normal: No  Mood: Anxious  Motor Activity:Normal: Yes  Motor Activity: Increased  Interview Behavior: Cooperative  Preception: [place] to Person, Anju Vandana to Time, [place] to Place, [place] to Situation  Attention:Normal: Yes  Attention: (organized)  Thought Processes: Circumstantial  Thought Content:Normal: Yes  Hallucinations: None  Delusions: No  Memory:Normal: Yes  Insight and Judgment: No  Insight and Judgment: Poor Insight  Present Suicidal Ideation: No  Present Homicidal Ideation: No    Santo Edge RN

## 2019-05-02 NOTE — PROGRESS NOTES
CLINICAL PHARMACY NOTE: MEDS TO [address] Select Patient?: Yes  Total # of Prescriptions Filled: 3   The following medications were delivered to the patient:  · Aripiprazole 5mg  · Divalproex 500 EC  · Natrexone 50mg  Total # of Interventions Completed: 2  Time Spent (min): 30    Additional Documentation:

## 2019-05-02 NOTE — DISCHARGE SUMMARY
Disheveled          [] Normal weight[] Thin  [] Overweight  [] Obese           Attitude: [] Positive  [] Hostile  [] Demanding  [] Guarded  [] Defensive         [x] Cooperative  []  Uncooperative      Behavior:  [x] Normal Gait  [] Walks with Assistance  [] Wanricardo Parag[place]    [] Walks with [place]  [] In Hospital Bed  [] Sitting in Chair    Muscle-Skeletal:  [x] Normal Muscle Tone [] Muscle Atrophy       [] Abnormal Muscle Movement     Eye Contact:  [x] Good eye contact  [] Intermittent Eye Contact  [] Poor Eye Contact     Mood: [] Depressed  [] Anxious  [] Irritated  [x] Euthymic   [] Angry [] Restless    Affect:  [x] Congruent  [] Incongruent  [] Labile  [] Constricted  [] Flat  [] Bizarre     Thought Process and Association:  [] Logical [] Illogical       [x] Linear and Goal Directed  [] Tangential  [] Circumstantial     Thought Content:  [x] Denies [] Endorses [] Suicidal [] Homicidal  [] Delusional      [] Paranoid  [] Somatic  [] Grandiose    Perception: [x]  None  [] Auditory   [] Visual  [] tactile   [] olfactory  [] Illusions         Insight: [] Intact  [x] Fair  [] Limited    Judgement:  [] Intact  [x] Fair  [] Limited    Hospital Course:   Admit Date: 4/27/2019     Discharge Date: 5/2/2019  Admitted from:  []  Emergency Room  []  Home  []  Another facility   []  [facility]     Admitting diagnosis:   Patient Active Problem List   Diagnosis    Chest pain    HTN (hypertension), benign    Hyperlipidemia    GERD (gastroesophageal reflux disease)    Depression with suicidal ideation    [facility] of [facility]      Length of stay:  3 days              James Tolbert was admitted in Psychiatric unit  from ED with derpession. Patient was treated with the above. Patient responded well to the treatment.      Discharge Summary Plan:     Discharge Status:    [x] Improved [] Unchanged    [] Worse       Discharge instructions given:  [x] Patient    [] Family [] Other         Discharge disposition:  [x] Home [] Step Down unit  [] Group Home []  [facility]                                                    [] [facility]    [] AMA  [] Other           Prescriptions: Continue same medications, review with patient.        Reason for more than one antipsychotic:  [x] N/A  [] 3 failed monotherapy(drugs tried):  [] Cross over to a new antipsychotic  [] Taper to monotherapy from polypharmacy  [] Augmentation of Clozapine therapy due to treatment resistance to single therapy      Diagnosis:        Patient Active Problem List   Diagnosis Code    Chest pain R07.9    HTN (hypertension), benign I10    Hyperlipidemia E78.5    GERD (gastroesophageal reflux disease) K21.9    Depression with suicidal ideation F32.9, R45.851    Severe episode of recurrent major depressive [facility], [facility] F[facility]       Education and Follow-up:  Counseled:  [x] Patient     [] Family    [] Guardian      Signed:   Steven Plasencia   5/2/2019   6:38 AM

## 2025-08-06 ENCOUNTER — HOSPITAL ENCOUNTER (OUTPATIENT)
Age: 55
Discharge: HOME | End: 2025-08-08

## 2025-08-11 LAB — SURGICAL PATHOLOGY REPORT: NORMAL
